# Patient Record
Sex: MALE | Race: BLACK OR AFRICAN AMERICAN
[De-identification: names, ages, dates, MRNs, and addresses within clinical notes are randomized per-mention and may not be internally consistent; named-entity substitution may affect disease eponyms.]

---

## 2018-02-03 ENCOUNTER — HOSPITAL ENCOUNTER (EMERGENCY)
Dept: HOSPITAL 17 - NEPE | Age: 23
Discharge: HOME | End: 2018-02-03
Payer: SELF-PAY

## 2018-02-03 VITALS
HEART RATE: 67 BPM | RESPIRATION RATE: 16 BRPM | OXYGEN SATURATION: 99 % | SYSTOLIC BLOOD PRESSURE: 140 MMHG | DIASTOLIC BLOOD PRESSURE: 78 MMHG | TEMPERATURE: 98.3 F

## 2018-02-03 VITALS — HEIGHT: 67 IN | WEIGHT: 207.23 LBS | BODY MASS INDEX: 32.53 KG/M2

## 2018-02-03 DIAGNOSIS — J03.90: Primary | ICD-10-CM

## 2018-02-03 PROCEDURE — 99283 EMERGENCY DEPT VISIT LOW MDM: CPT

## 2018-02-03 PROCEDURE — 96372 THER/PROPH/DIAG INJ SC/IM: CPT

## 2018-02-03 NOTE — PD
HPI


Chief Complaint:  ENT Complaint


Time Seen by Provider:  01:06


Travel History


International Travel<30 days:  No


Contact w/Intl Traveler<30days:  No


Traveled to known affect area:  No





History of Present Illness


HPI


Patient 22-year-old male presents emergency department with sore throat, no 

cough no congestion he states he has had her history of recurrent tonsillitis 

and wants to make sure he does not have it again, states chills without fevers.

  Symptoms mild for the past few days, gradually worsening, not associated with 

any difficulty swallowing context and associated signs and symptoms as above.





PFSH


Past Medical History


Medical History:  Denies Significant Hx


Influenza Vaccination:  No





Past Surgical History


Surgical History:  No Previous Surgery





Social History


Alcohol Use:  Yes ("OCCASIONALLY")


Tobacco Use:  No


Substance Use:  No





Allergies-Medications


(Allergen,Severity, Reaction):  


Coded Allergies:  


     No Known Allergies (Unverified , 2/3/18)





Review of Systems


Except as stated in HPI:  all other systems reviewed are Neg





Physical Exam


Narrative


GENERAL: Well-nourished, well-developed patient.


SKIN: Focused skin assessment warm/dry.


HEAD: Normocephalic.


EYES: No scleral icterus. No injection or drainage. 


ENT: Mildly engorged purulent tonsils, airway widely patent, no anterior 

lymphadenopathy in cervical region.  TMs clear bilaterally.


NECK: Supple, trachea midline. No JVD or lymphadenopathy.


CARDIOVASCULAR: Regular rate and rhythm without murmurs, gallops, or rubs. 


RESPIRATORY: Breath sounds equal bilaterally. No accessory muscle use.


GASTROINTESTINAL: Abdomen soft, non-tender, nondistended. 


MUSCULOSKELETAL: No cyanosis, or edema. 


BACK: Nontender without obvious deformity. No CVA tenderness.





Data


Data


Last Documented VS





Orders





 Orders


Penicillin G Benzathine Inj (Bicillin L- (2/3/18 01:15)


Ed Discharge Order (2/3/18 01:22)








[facility]


Medical Decision Making


Medical Screen Exam Complete:  Yes


Emergency Medical Condition:  Yes


Differential Diagnosis


Tonsillitis, streptococcal pharyngitis, tonsillar abscess unlikely, 

peritonsillar abscess unlikely.


Narrative Course


Patient room to the emergency department, symptoms consistent with a mild 

tonsillitis, consider streptococcal pharyngitis.  Will cover with Bicillin.  

Discussed follow-up with an ear nose throat doctor and his regular physician.  

Stable for discharge





Diagnosis





 Primary Impression:  


 Tonsillitis


Disposition:  01 DISCHARGE HOME


Condition:  Stable











Hirsch,Sherwin J MD Feb 3, 2018 01:15